# Patient Record
Sex: MALE | Race: WHITE | NOT HISPANIC OR LATINO | Employment: PART TIME | URBAN - METROPOLITAN AREA
[De-identification: names, ages, dates, MRNs, and addresses within clinical notes are randomized per-mention and may not be internally consistent; named-entity substitution may affect disease eponyms.]

---

## 2024-01-01 ENCOUNTER — THERAPY VISIT (OUTPATIENT)
Dept: PHYSICAL THERAPY | Facility: REHABILITATION | Age: 64
End: 2024-01-01
Attending: NEUROLOGICAL SURGERY
Payer: COMMERCIAL

## 2024-01-01 ENCOUNTER — TRANSCRIBE ORDERS (OUTPATIENT)
Dept: NEUROSURGERY | Facility: CLINIC | Age: 64
End: 2024-01-01

## 2024-01-01 DIAGNOSIS — M47.12 CERVICAL SPONDYLOSIS WITH MYELOPATHY: Primary | ICD-10-CM

## 2024-01-01 DIAGNOSIS — R26.9 GAIT ABNORMALITY: ICD-10-CM

## 2024-01-01 DIAGNOSIS — R53.1 WEAKNESS GENERALIZED: ICD-10-CM

## 2024-01-01 DIAGNOSIS — R26.81 UNSTEADINESS ON FEET: ICD-10-CM

## 2024-01-01 PROCEDURE — 97535 SELF CARE MNGMENT TRAINING: CPT | Mod: GP | Performed by: PHYSICAL THERAPIST

## 2024-01-01 PROCEDURE — 97161 PT EVAL LOW COMPLEX 20 MIN: CPT | Mod: GP | Performed by: PHYSICAL THERAPIST

## 2024-04-23 PROBLEM — R26.9 GAIT ABNORMALITY: Status: ACTIVE | Noted: 2024-01-01

## 2024-04-23 PROBLEM — M47.12 CERVICAL SPONDYLOSIS WITH MYELOPATHY: Status: ACTIVE | Noted: 2024-01-01

## 2024-04-23 PROBLEM — R53.1 WEAKNESS GENERALIZED: Status: ACTIVE | Noted: 2024-01-01

## 2024-04-23 PROBLEM — R26.81 UNSTEADINESS ON FEET: Status: ACTIVE | Noted: 2024-01-01

## 2024-04-23 NOTE — PATIENT INSTRUCTIONS
Call primary care provider: check in about new weakness/fatigue in the past week- vitals were stable in clinic including blood pressure, heart rate and oxygen, referral to nutrition and  for any additional resources

## 2024-09-09 NOTE — PROGRESS NOTES
DISCHARGE  Reason for Discharge: Pt is .    Equipment Issued: none    Discharge Plan: n/a    Referring Provider:  Kina Day     [id number]   Appointment Info   Signing clinician's name / credentials Genoveva Davalos, PT, DPT, CLANTHONY   Visits Used 1   Medical Diagnosis Cervical spondylosis with myelopathy   PT Tx Diagnosis Cervical spondylosis with myelopathy, LE weakness, impairment in gait, unsteadiness on feet   Other pertinent information Per order:   Cervical myelopathy- balance and gait training, strengthening; weaning out of collar, neck strengthening, cervical ROM- sp laminoplasty on ; no restrictions   Progress Note/Certification   Onset of illness/injury or Date of Surgery 24   Therapy Frequency 1-2x/week   Predicted Duration 12 weeks   Progress Note Due Date 24   Progress Note Completed Date 24   GOALS   PT Goals 2;3;4   PT Goal 1   Goal Identifier Walking   Goal Description pt will be able to walk for 20 min without an AD to return to PLOF   Target Date 24   PT Goal 2   Goal Identifier 5TSTS   Goal Description Pt will be able to complete 5TSTS in <12 seconds to demonstrate a decreased falls risk   Target Date 24   Treatment Interventions (PT)   Interventions Therapeutic Procedure/Exercise;Self Care/Home Management   Self Care/home Management   ADL/Home Mgmt Training (31495) 10   Self Care Self Care 2   Self Care 1 Vitals   Self Care 1 - Details education on vitals and that they are currently stable, encouraged pt and friend to contact PCP with update on increased fatigue, use of AD, and weakness in the past week, pt can also discuss possible  and nutrition referrals as well- provided instructions. Asked if pt was willing to see urgent care but due to financial cost pt reports he will just follow up with PCP   Skilled Intervention education to address new symptoms   Patient Response/Progress pt and friend verbalized understanding   Self  Care 2 Walking   Self Care 2 - Details education on use of walker at all times due to recent fatigue/weakness - again follow up with PCP   Eval/Assessments   PT Eval, Low Complexity Minutes (36532) 30   Education   Learner/Method Patient;Pictures/Video   Plan   Home program PTRx   Plan for next session finish objective measures if fatigue/weakness has improved, initiate HEP for LE/posture strengthening, balance, provide with falls risk handout   Total Session Time   Timed Code Treatment Minutes 10   Total Treatment Time (sum of timed and untimed services) 40